# Patient Record
Sex: MALE
[De-identification: names, ages, dates, MRNs, and addresses within clinical notes are randomized per-mention and may not be internally consistent; named-entity substitution may affect disease eponyms.]

---

## 2021-02-23 PROBLEM — Z00.00 ENCOUNTER FOR PREVENTIVE HEALTH EXAMINATION: Status: ACTIVE | Noted: 2021-02-23

## 2021-03-01 ENCOUNTER — APPOINTMENT (OUTPATIENT)
Dept: GASTROENTEROLOGY | Facility: CLINIC | Age: 56
End: 2021-03-01
Payer: COMMERCIAL

## 2021-03-01 VITALS
TEMPERATURE: 97.7 F | OXYGEN SATURATION: 98 % | HEART RATE: 63 BPM | HEIGHT: 65 IN | WEIGHT: 162 LBS | BODY MASS INDEX: 26.99 KG/M2 | SYSTOLIC BLOOD PRESSURE: 120 MMHG | DIASTOLIC BLOOD PRESSURE: 80 MMHG

## 2021-03-01 DIAGNOSIS — Z12.11 ENCOUNTER FOR SCREENING FOR MALIGNANT NEOPLASM OF COLON: ICD-10-CM

## 2021-03-01 DIAGNOSIS — Z78.9 OTHER SPECIFIED HEALTH STATUS: ICD-10-CM

## 2021-03-01 DIAGNOSIS — R10.13 EPIGASTRIC PAIN: ICD-10-CM

## 2021-03-01 DIAGNOSIS — R12 HEARTBURN: ICD-10-CM

## 2021-03-01 DIAGNOSIS — E78.00 PURE HYPERCHOLESTEROLEMIA, UNSPECIFIED: ICD-10-CM

## 2021-03-01 DIAGNOSIS — R10.9 UNSPECIFIED ABDOMINAL PAIN: ICD-10-CM

## 2021-03-01 DIAGNOSIS — Z01.818 ENCOUNTER FOR OTHER PREPROCEDURAL EXAMINATION: ICD-10-CM

## 2021-03-01 PROCEDURE — 99072 ADDL SUPL MATRL&STAF TM PHE: CPT

## 2021-03-01 PROCEDURE — 99202 OFFICE O/P NEW SF 15 MIN: CPT

## 2021-03-01 RX ORDER — POLYETHYLENE GLYCOL 3350 AND ELECTROLYTES WITH LEMON FLAVOR 236; 22.74; 6.74; 5.86; 2.97 G/4L; G/4L; G/4L; G/4L; G/4L
236 POWDER, FOR SOLUTION ORAL
Qty: 1 | Refills: 0 | Status: ACTIVE | COMMUNITY
Start: 2021-03-01 | End: 1900-01-01

## 2021-03-01 RX ORDER — ATORVASTATIN CALCIUM 20 MG/1
20 TABLET, FILM COATED ORAL
Refills: 0 | Status: ACTIVE | COMMUNITY

## 2021-03-01 NOTE — CONSULT LETTER
[Dear  ___] : Dear  [unfilled], [Consult Letter:] : I had the pleasure of evaluating your patient, [unfilled]. [Please see my note below.] : Please see my note below. [Consult Closing:] : Thank you very much for allowing me to participate in the care of this patient.  If you have any questions, please do not hesitate to contact me. [Sincerely,] : Sincerely, [FreeTextEntry2] : Chidi Rice MD [FreeTextEntry3] : Angelito Wakefield MD\par

## 2021-03-01 NOTE — HISTORY OF PRESENT ILLNESS
[de-identified] : Mar 01, 2021 \par \par PRINCESS MONTENEGRO MD takes care of this very pleasant 56-year-old Telugu-speaking gentleman\par \par  service used\par \par He has fairly chronic heartburn and indigestion some epigastric discomfort\par \par Generally when he eats fatty foods or spicy foods\par \par No weight loss\par \par No anorexia, nausea, vomiting\par \par He believes he was placed on Pepcid by his primary doctor with good results\par \par Has never had colonoscopy\par \par Mr. ESTEBAN BARRERA 56 year is referred for colon cancer screening.  The patient denies any change in bowel movements, blood per rectum, , pelvic or rectal discomfort.   \par \par There is no family history of colon cancer or other gastrointestinal cancers.\par \par The patient denies any unexplained weight loss, fever chills or night sweats.\par \par This is the first screening colonoscopy for the patient.\par \par There is no significant cardiac or pulmonary history.\par \par No complaints of chest pain, shortness of breath, palpitations, cough.\par \par The patient is feeling quite well.\par \par The patient is on no significant anticoagulant therapy or anti platelet therapy. \par \par No adverse reaction to anesthesia in the past.\par \par

## 2021-03-01 NOTE — REASON FOR VISIT
[Initial Evaluation] : an initial evaluation [FreeTextEntry1] : Epigastric discomfort, colon cancer screening and heartburn

## 2021-03-01 NOTE — ASSESSMENT
[FreeTextEntry1] : Impression\par \par Heartburn and indigestion\par \par Epigastric discomfort fatty foods and spicy foods\par \par Request for colon cancer screening\par \par Suggest\par \par Continue Pepcid 40 mg a day\par \par Avoid fatty foods\par \par Antireflux diet\par \par GoLYTELY\par \par Agree with colonoscopy and upper endoscopy\par \par Ultrasound of the abdomen\par \par The laxative, or its risks benefits and alternatives have been thoroughly reviewed with the patient in great detail. The laxative instructions have been reviewed in great detail with the patient.\par \par Risks/benefits:\par The procedure, the risks and benefits and alternatives have been reviewed in great detail with the patient.  Risks including, but not limited to sedation such as cardiac and pulmonary compromise, the procedure itself such as bleeding requiring hospitalization, transfusion, surgery, temporary or permanent colostomy.  Perforation or puncture of the requiring hospitalization, surgery, temporary colostomy.\par It has been explained to the patient that though colonoscopy is thought to be the best screening exam for colon cancer and polyps, no screening exam can find all colon polyps or cancers.  \par The patient expresses understanding of the procedure and consents to undergoing the procedure.\par

## 2021-06-23 ENCOUNTER — APPOINTMENT (OUTPATIENT)
Dept: GASTROENTEROLOGY | Facility: AMBULATORY MEDICAL SERVICES | Age: 56
End: 2021-06-23

## 2021-06-23 ENCOUNTER — NON-APPOINTMENT (OUTPATIENT)
Age: 56
End: 2021-06-23

## 2021-08-03 ENCOUNTER — APPOINTMENT (OUTPATIENT)
Dept: GASTROENTEROLOGY | Facility: AMBULATORY MEDICAL SERVICES | Age: 56
End: 2021-08-03
Payer: COMMERCIAL

## 2021-08-03 PROCEDURE — 43239 EGD BIOPSY SINGLE/MULTIPLE: CPT

## 2021-08-03 PROCEDURE — 45380 COLONOSCOPY AND BIOPSY: CPT

## 2021-08-17 RX ORDER — FAMOTIDINE 40 MG/1
40 TABLET, FILM COATED ORAL
Qty: 30 | Refills: 5 | Status: ACTIVE | COMMUNITY
Start: 1900-01-01 | End: 1900-01-01